# Patient Record
(demographics unavailable — no encounter records)

---

## 2017-01-01 NOTE — NEWBORN ADMISSION
Delivery Information


Date of Service


2017.





[place] Information


 YOB: 2017


[place] Time of Birth:  0801


 Birth Weight:


2.985 kg        6lbs  9.3oz


[place] Length (height) inches:  19.00


Infant Head Circumference:  35.00


Sex:  Female


Race:  





Attendance at Delivery


Pediatrician ATTN at delivery?:  No





Method of Delivery


Delivery Type:  vaginal delivery





Gestational Age


Gestational Age:  39.3





Mother's Information


Demographics:  Age (27),  (3), Para (1 now 2), Living children (1 now 2)


Marital Status:  


Blood Type:  O, rh -


Group B Strep Status:  negative


VDRL:  Non-reactive


Rubella Status:  Immune


HbSAg:  negative


HIV:  negative


Chlamydia:  negative


Gonorrhea:  negative


Maternal Anesthesia:  epidural





Delivery Care


Resuscitation:  stimulation/drying


Transported to nursery:  doing well





APGAR Scoring


APGAR 1 Minute:  8


APGAR 5 minute:  9





Admission Physical


Physical Examination


General Appearance:  + normal appearance, + normal nutrition, + normal tone


Skin:  No jaundice, No rash


Head/Neck:  + anterior fontanelle open & flat, + molding


Eyes:  + red reflex bilaterally, No conjunctivitis, No scleral icterus


Ears, Nose, Throat:  + ear canals patent, + nares patent, No lip deformity, No 

palate deformity


Thorax:  + normal appearance


Lungs:  + clear


Heart:  + regular rate and rhythm, No murmur


Abdomen:  + normal bowel sounds, + soft, No mass


Female Genitalia:  + normal female


Trunk & Spine:  No abnormalities


Extremities:  + clavicles intact, No hip click


Reflexes:  + normal ana, + normal suck


Anus:  patent





Impression


term, AGA

## 2017-01-01 NOTE — DIAGNOSTIC IMAGING REPORT
CHEST 2 VIEWS ROUTINE



CLINICAL HISTORY: 3 months-old Female presenting with eval for pneumonia. 



TECHNIQUE: Portable supine AP view of the chest was obtained.



COMPARISON:  2017.



FINDINGS:

Cardiothymic silhouette normal. Mild bronchial wall thickening suggested. Lungs

and pleural spaces clear. Osseous structures and upper abdomen normal.



IMPRESSION:

1.  Mild bronchial wall thickening could be seen in the setting of reactive

airways disease or viral bronchiolitis. No focal infiltrate to suggest

pneumonia.







Electronically signed by:  Barrett Rock M.D.

2017 7:41 AM



Dictated Date/Time:  2017 7:39 AM

## 2017-01-01 NOTE — DIAGNOSTIC IMAGING REPORT
ABDOMEN LIMITED (US)



CLINICAL HISTORY: Projectile vomiting.   



COMPARISON STUDY:  None.



FINDINGS: The pylorus is normal in length and thickness. Fluid was seen passing

through the pyloric channel. 



IMPRESSION:  No evidence for hypertrophic pyloric stenosis. 









Electronically signed by:  Chang Armenta M.D.

2017 12:30 PM



Dictated Date/Time:  2017 12:29 PM

## 2017-01-01 NOTE — DIAGNOSTIC IMAGING REPORT
CHEST 2 VIEWS ROUTINE



CLINICAL HISTORY: cough, congestion dyspnea



COMPARISON STUDY:  No previous studies for comparison.



FINDINGS: Mild pulmonary hyperaeration. Slight diaphragmatic flattening. No

focal infiltrate. No evidence for cardiac enlargement. 



IMPRESSION:  Mild pulmonary hyperaeration. No focal infiltrate. 









Electronically signed by:  Braulio Richardson M.D.

2017 12:05 PM



Dictated Date/Time:  2017 12:05 PM

## 2017-01-01 NOTE — NEWBORN DISCHARGE
Delivery Information


Date of Service


2017.





[place] Information


 YOB: 2017


[place] Time of Birth:  0801


Infant Head Circumference:  35.00


Sex:  Female


Race:  





Attendance at Delivery


Pediatrician ATTN at delivery?:  No





Method of Delivery


Delivery Type:  vaginal delivery





Gestational Age


Gestational Age:  39.3





Mother's Information


Demographics:  Age (27),  (3), Para (1 now 2), Living children (1 now 2)


Marital Status:  


[place] Name:  Daily


Blood Type:  O, rh -


Group B Strep Status:  negative


VDRL:  Non-reactive


Rubella Status:  Immune


HbSAg:  negative


HIV:  negative


Chlamydia:  negative


Gonorrhea:  negative


Maternal Anesthesia:  epidural





Delivery Care


Resuscitation:  stimulation/drying


Transported to nursery:  doing well





APGAR Scoring


APGAR 1 Minute:  8


APGAR 5 minute:  9





Discharge Physical


Admission Date:  2017


Infant Head Circumference:  35.00


 Length (height) inches:  19.00


[place] Birth Weight:


2.985 kg        6lbs  9.3oz


Discharge Weight:


2.920kg         6lbs 7.0oz


Weight Change (Kilograms):  -0.065


Percent Weight Change:  -2.00


Discharge Date:  2017


Physical Examination


General Appearance:  + normal appearance, + normal nutrition, + normal tone


Skin:  No jaundice, No rash


Head/Neck:  + anterior fontanelle open & flat


Eyes:  + red reflex bilaterally, No conjunctivitis, No scleral icterus


Ears, Nose, Throat:  + ear canals patent, + nares patent, No lip deformity, No 

palate deformity


Thorax:  + normal appearance


Lungs:  + clear, No abnormal respiratory effort


Heart:  + normal pulses (+2 femorals), + regular rate and rhythm, No murmur


Abdomen:  + normal bowel sounds, + soft, No mass


Female Genitalia:  + normal female


Trunk & Spine:  No abnormalities (None visible)


Extremities:  + clavicles intact, + normal hips, No hip click


Reflexes:  + normal grasp, + normal ana, + normal suck


Anus:  patent





Laboratory Results











Test


  17


08:01


 


Cord Blood Type O POSITIVE 


 


Direct Antiglobulin Test


([place]) NEGATIVE 


 


 


Direct Antiglobulin Test, Poly NEG 











Impression & Diagnosis


healthy, term, AGA








Jaundice Risk Assessment


minimal





Hepatitis B Vaccine


Hepatitis B Vaccine Given On:  2017





Discharge Comments


Condition at Discharge:  Stable


Type of Feeding:  Breast (and supplementing with EBM and Similac)


Feeding:  well


Follow-Up Date:  2017


Additional Comments:


[facility] at [facility] with Dr. Meek at 1 pm

## 2017-01-01 NOTE — EMERGENCY ROOM VISIT NOTE
History


Report prepared by Jac:  Tiffany Fonseca


Under the Supervision of:  Dr. Eduardo Samaniego M.D.


First contact with patient:  11:14


Chief Complaint:  COUGH


Stated Complaint:  COUGH,CONGESTION,VOMITTING





History of Present Illness


The patient is a 2M 15D old female who presents to the Emergency Room with 

complaints of persistent congestion for the past two months.  The patient's 

mother states that the patient has been seen by her pediatrician multiple times 

and has seen multiple providers for her symptoms.  She reports that the patient 

has had stomach issues and cough and congestion.  The patient patient's mother 

states that the patient had an ultrasound which was negative.  She states that 

she has raised concern for reflux, but states that the pediatrician was not 

concerned about reflux at this time.  The patient's mother states that she has 

been thickening the patient's Alimentum Formula with cereal.  She states that 

last evening the patient coughed persistently last evening, gagged, and then 

spit up four ounces of fluid.  The patient's father states that the patient 

appears increasingly restricted with lying flat.  The patient's mother states 

that the patient is typically fussy if she is awake and not asleep.  The parent 

denies LOC, headache, fevers, chills, visual complaints, neck pain/limited ROM, 

sore throat, difficulty with swallowing, chest pain, back pain, abdominal pain, 

melena, hematochezia, urinary symptoms, numbness/weakness, lymphadenopathy, rash

, joint tenderness/swelling, or other complaints.





   Source of History:  parent


   Onset:  tow months


   Position:  other (global)


   Quality:  other (congestion)


   Timing:  other (persistent)


   Associated Symptoms:  + cough


Note:


Associated Symptoms: fussiness, gagging, stomach issues





Review of Systems


See HPI for pertinent positives and negatives.  A total of ten systems were 

reviewed and were otherwise negative.





Past Medical & Surgical


Medical Problems:


(1) Normal vaginal delivery


(2) Term birth of female 








Family History





Cancer


Gallbladder disease


Heart disease


Lung disease





Social History


Smoking Status:  Never Smoker


Smokeless Tobacco Use:  No


Alcohol Use:  none


Drug Use:  none


Marital Status:  single


Housing Status:  lives with family





Current/Historical Medications


Scheduled


Nystatin (Nystatin Suspension), 1 ML PO QID


Ranitidine Hcl (Zantac), 0.6 ML PO TID





Allergies


Coded Allergies:  


     No Known Allergies (Unverified , 17)





Physical Exam


Vital Signs











  Date Time  Temp Pulse Resp B/P (MAP) Pulse Ox O2 Delivery O2 Flow Rate FiO2


 


17 13:35  155 36  96   


 


17 10:59 37.3 148 38  96 Room Air  











Physical Exam


GENERAL: Awake, alert, well appearing, nontoxic, in no distress


HEAD: Atraumatic. No edema. [place] is soft.


EYES: Normal conjunctiva. Sclera non-icteric.


EARS: Right TM normal. Left TM normal.


NOSE: Clear nasal congestion.


OROPHARYNX: Oral thrush.  Lips, tongue, and mucosa unremarkable. No exudate, 

ulcerations.


NECK: Supple. No nuchal rigidity. FROM. No adenopathy.


RESPIRATORY: CTA bilaterally


CARDIAC: Regular rate, normal rhythm.


ABDOMEN: Soft, non distended. No tenderness to palpation. No hernias.


BACK: Unremarkable.


: Unremarkable. 


SKIN: No rash or jaundice noted. No desquamation.


LYMPH: No adenopathy.


MUSCULOSKELETAL: No edema or ecchymosis. No joint swelling. 


NEURO: Normal sensorium. No sensory or motor deficits noted.





Medical Decision & Procedures


ER Provider


Diagnostic Interpretation:


X-ray: Per my interpretation, radiologist review. 





CHEST 2 VIEWS ROUTINE





CLINICAL HISTORY: cough, congestion dyspnea





COMPARISON STUDY:  No previous studies for comparison.





FINDINGS: Mild pulmonary hyperaeration. Slight diaphragmatic flattening. No


focal infiltrate. No evidence for cardiac enlargement. 





IMPRESSION:  Mild pulmonary hyperaeration. No focal infiltrate. 





Electronically signed by:  Braulio Richardson M.D.


2017 12:05 PM





Dictated Date/Time:  2017 12:05 PM





ED Course


1124: The patient was evaluated in room C9. A complete history and physical 

exam was performed.





1300: I reevaluated the patient and she is doing well and fed great.  I 

discussed the exam findings with the patients parents and I discussed the 

treatment plan. 





1305: I discussed the patients case with Dr. Alvarado [facility].  She 

states that the patient should be started on Zantac.





1325: I reevaluated the patient and she is doing well.  I discussed the updated 

treatment plan with the patient's parents and they verbalized complete 

understanding and agreement.  They are ready to take the patient home.





Medical Decision


Medication Reconciliation: I attest that I have personally reviewed the patient'

s current medication list





Triage Nursing notes reviewed.


The patient's presentation and history were concerning for vomiting, cough and 

congestion.








Etiologies such as reflux, thrush, congestion, pyloric stenosis, dehydration, 

viral syndrome, otitis, pharyngitis, pneumonia, meningitis, urinary tract 

infection, sepsis, bacteremia, anatomic abnormality, intussusception, as well 

as others were entertained.





  The patient was evaluated.  Clinically she looks fantastic.  She has some 

nasal congestion but this is not purulent.  Her reflexes are good.  She is 

strong.  She does not appear dehydrated.  Because of the persistent cough a 

chest x-ray was performed and was unremarkable.  The patient had a consultation 

made with pediatrics.  I discussed the case with Dr. Alvarado.  Given the history 

and symptoms we felt that reflux was likely intervening to the issue.  The 

thrush may also be contributing.  The patient will continue the nystatin.  The 

patient will be started on ranitidine at Dr. Alvarado's request and will follow-up 

closely in the office.  I discussed this with the patient's family.  They felt 

very comfortable with this.  I gave my usual and customary discussion regarding 

this issue.  The patient has any problems she will come back to the emergency 

department for reevaluation.





Consults


Time Called:  1300


Consulting Physician:  [facility]


Returned Call:  1305


I discussed the patients case with [facility].  She 

states that the patient should be started on Zantac.





Impression





 Primary Impression:  


 Vomiting


 Additional Impression:  


 Oral thrush





Scribe Attestation


The scribe's documentation has been prepared under my direction and personally 

reviewed by me in its entirety. I confirm that the note above accurately 

reflects all work, treatment, procedures, and medical decision making performed 

by me.





Departure Information


Dispostion


Home / Self-Care





Prescriptions





Ranitidine Hcl (ZANTAC) 75 Mg/5 Ml Syp


0.6 ML PO TID for 30 Days, #54 ML 1 Refill


   Prov: Eduardo Samaniego MD         17





Referrals


Chang Meek MD (PCP)





Forms


HOME CARE DOCUMENTATION FORM,                                                 

               IMPORTANT VISIT INFORMATION





Patient Instructions


My [facility]





Additional Instructions





Ranitidine liquid: 0.6ml 3 times daily for reflux.





Continue current nystatin.





Keep the child upright or in a car seat after feedings.





Raise the head of the crib slightly as discussed.





Return to the Emergency Room for vomiting, bloody stool, fever, abnormal 

behavior, or as needed.





Follow-up with pediatrics by the end of the week for reassessment.  Call today 

to set the appointment.





Problem Qualifiers

## 2017-01-01 NOTE — EMERGENCY ROOM VISIT NOTE
History


Report prepared by Husamibbarbra:  Preet Hernandez


Under the Supervision of:  Dr. Sultana Rivera D.O.


First contact with patient:  00:48


Chief Complaint:  COUGH


Stated Complaint:  GAGING COUCH,CONGESTION





History of Present Illness


The patient is a 3M 28D year old female who presents to the Emergency Room with 

complaints of a worsening cough that started three days ago. The patient is 

accompanied by her mother who states that the patient started to experience a 

cough and yellow rhinorrhea that began three days ago. She states that she 

noticed the patient was experiencing dyspnea because she has been gagging for 

air. Mom reports that the patient was sleeping tonight, but woke herself up due 

to her dyspnea and cough. She states that the patient felt warm when she picked 

the patient up. Mom reports that she tried to use a humidifier to alleviate 

symptoms, but denies any relief. She admits that the patient was seen in the ED 

a month ago for similar symptoms, but states that these current symptoms are 

worse. Mom states that the patient has been eating normal and had 2 ounces in 

the morning yesterday, 3 ounces at , and 6 ounces after . She 

reports that the patient was born three days early and has a history of acid 

reflux. Mom states that she and the patient's sister have been experiencing 

sinus infections, but denies that the patient has been around anyone sick. Mom 

denies any fevers.





   Source of History:  patient


   Onset:  three days ago


   Position:  other (global)


   Timing:  worsening


   Modifying Factors (Relieving):  other


   Associated Symptoms:  + SOB, No fevers





Review of Systems


See HPI for pertinent positives & negatives. A total of 10 systems reviewed and 

were otherwise negative.





Past Medical & Surgical


Medical Problems:


(1) Acid reflux


(2) Normal vaginal delivery


(3) Term birth of female 








Family History





Cancer


Gallbladder disease


Heart disease


Lung disease





Social History


Smoking Status:  Never Smoker


Alcohol Use:  none


Drug Use:  none


Marital Status:  single


Housing Status:  lives with family





Current/Historical Medications


Scheduled


Ranitidine Hcl (Zantac), 0.7 ML PO TID





Allergies


Coded Allergies:  


     No Known Allergies (Unverified , 17)





Physical Exam


Vital Signs











  Date Time  Temp Pulse Resp B/P (MAP) Pulse Ox O2 Delivery O2 Flow Rate FiO2


 


8/3/17 02:22 37.0 143 30  97 Room Air  


 


8/3/17 01:08     97 Room Air  


 


8/3/17 00:21 37.7 164 28  97 Room Air  











Physical Exam


HEENT: Fontanelles are soft and flat. Head - normocephalic and atraumatic   

Pupils are equal, round, and reactive to light.  Extraocular eye muscles are 

intact, and sclera are anicteric.   Nose -  moist nasal mucosa without 

discharge. Mouth - moist buccal mucosa.  Oropharynx is nonerythematous and 

there is no tonsillar exudate or edema noted.


EARs: normal TMs


Neck: no cervical adenopathy


Heart: Regular rate and rhythm.  There is a normal S1 and S2 with no murmurs, 

clicks, or gallops appreciated.


Lungs: Clear to auscultation bilaterally with no wheezes, rales, or rhonchi.


Abdomen: Soft, completely nontender, nondistended, with good bowel sounds.  

There are no palpable pulsatile masses or hepatosplenomegaly.  There is no 

guarding, rigidity, or rebound noted.


Extremities: No evidence of cyanosis, clubbing, or edema.  There are easily 

palpable peripheral pulses.


Skin: warm and dry with good turgor and no rashes.





Medical Decision & Procedures


ER Provider


Diagnostic Interpretation:


X-ray results as stated below per interpretation by me:





CHEST XRAY: No obvious pulmonary infiltrates.





ED Course


0051: Past medical records reviewed. The patient was evaluated in room B05. A 

complete history and physical exam was performed.  The patient went for a chest 

x-ray which was unremarkable.  O2 saturations were monitored and were stable.











0201: I reevaluated the patient and she was asleep. Her oxygen saturation is 

normal. I discussed the results and treatment plan with the patient's mother. 

She understands and agrees. The patient was discharged home.





Medical Decision


The patient is a 3 month old female who presents to the ED with complaints of a 

worsening cough that began three days ago. Differential diagnosis includes 

bronchiolitis, pneumonia, URI, viral illness





The child was in no acute respirator distress.  O2 saturations were stable.  

Chest x-ray was unremarkable.  I did witness the child coughing at one point 

and she did seem to have some excessive secretions which cost her to gag.  I 

believe that she most likely suffers from post tussive emesis.





I've asked the mother to follow up with pediatrician for reevaluation today.





Impression





 Primary Impression:  


 Cough





Scribe Attestation


The scribe's documentation has been prepared under my direction and personally 

reviewed by me in its entirety. I confirm that the note above accurately 

reflects all work, treatment, procedures, and medical decision making performed 

by me.





Departure Information


Dispostion


Home / Self-Care





Referrals


Chang Meek MD (PCP)





Forms


HOME CARE DOCUMENTATION FORM,                                                 

               IMPORTANT VISIT INFORMATION





Patient Instructions


My [facility]





Additional Instructions





Watch her closely for any respiratory distress.





Follow up later today with Peds for a recheck





Turn her on her side if she is coughing





Use bulb syringe to suction the nose

## 2017-01-01 NOTE — DISCHARGE INSTRUCTIONS
Discharge Instructions


Date of Service


2017.





Birthday & Weight Information


Birthday:  17        


Time of Birth:  08:01


Birth Weight:  2.985 kg   6lbs  9.3oz





.





Discharge Weight Information


.


Discharge Weight:  2.920kg   6lbs 7.0oz


Weight Change (Kilograms):   -0.065         


Percent Weight Change:   -2.00 % 





.





Impression / Diagnosis


Impression / Diagnosis:  


(1) Term birth of female 





 Blood Type











Test


  17


08:01


 


Cord Blood Type O POSITIVE 








.





[place] Supplemental Screening has been completed.





.





Procedures


Procedures Performed:  none





Hepatitis B Vaccine


1st Hepatitis B Vaccine Given:  2017





Instructions


Type of Feeding:  Breast (and supplementing with EBM and Similac)


.





Feeding Instructions





If Breastfeeding:





* Feed baby at least 8-10 times in 24 hours.


* Babies most often nurse every 2-3 hours.  Time this from the beginning of the 

first feeding to the beginning of the next.


* Complete breastfeeding log record.  Take with you to your first visit with 

the baby's doctor.


* Call doctor if baby has less wet or soiled diapers than expected.





.





Baby's Office Visit


Follow-Up:  2017


[facility] at [facility] with Dr. Meek at 1 pm





Provider Instructions


.


SPECIAL CARE INSTRUCTIONS:





Bathing:


* Sponge baths every 2-3 days.  No tub baths until cord is completely healed.  

This usually takes 10-14 days.








Call your baby's doctor if:





* Temperature is greater that or equal to 100.4 degrees Fahrenheit or 38.0 

degrees Celsius.  Any fever up to the age of eight weeks needs to be evaluated 

by the physician.  Do not give any medications to infants without first talking 

with their physician.





* Yellow/green drainage, foul odor, increased redness or swelling of cord/

circumcision.





* Unable to awaken baby or excessive irritability.





* Your infant has any green vomiting.





* Diarrhea (frequent large watery stools or bloody/mucousy stools).





* Breathing difficulty (other than stuffy nose).





* Skin color changes.


 * blue spells


 * increased jaundice (yellow) that is not improving








Instructions noted above were prepared by Anthony Ledezma.


.

## 2018-01-06 NOTE — EMERGENCY ROOM VISIT NOTE
History


Report prepared by Jac:  Cooper Anthony


Under the Supervision of:  Dr. Ron Torre D.O.


First contact with patient:  11:06


Chief Complaint:  FLU LIKE SX


Stated Complaint:  PERSISTANT COUGH, VOMITING, CONGESTION, EYE "GOOP"





History of Present Illness


The patient is a 9M 0D old female who presents to the Emergency Room with 

complaints of an intermittent cough beginning a few days ago. The patient's 

father states the patient had an ear infection and eye drainage three weeks ago 

and stopped taking antibiotics two weeks ago. He reports for the past two days 

she has been experiencing an intermittent cough and runny nose. The father 

notes she had a two hour coughing spell last night and a 15 minute coughing 

spell today. He states the patient was evaluated at [facility] 

yesterday and was told she had a URI. The father reports her symptoms were 

persistent still, and he wanted the patient to be evaluated again. He notes she 

had a decrease in wet diapers, and her feces has been dark. The father states 

she coughed to the point she vomited mucus today. He reports the patient had 3 

oz of formula from a bottle today, and then is less than usual. The father 

notes the patient's shots are UTD. He denies fevers.





   Source of History:  patient


   Onset:  a few days ago


   Position:  other (global)


   Quality:  other (cough)


   Timing:  intermittent


   Associated Symptoms:  No fevers


Note:


Associated symptoms: runny nose, eye drainage, dark feces, decrease in wet 

diapers





Review of Systems


See HPI for pertinent positives & negatives. A total of 10 systems reviewed and 

were otherwise negative.





Past Medical & Surgical


Medical Problems:


(1) Acid reflux


(2) Normal vaginal delivery


(3) Term birth of female 








Family History





Cancer


Gallbladder disease


Heart disease


Lung disease





Social History


Smoking Status:  Never Smoker


Alcohol Use:  none


Drug Use:  none


Marital Status:  single


Housing Status:  lives with family





Current/Historical Medications


No Active Prescriptions or Reported Meds





Allergies


Coded Allergies:  


     No Known Allergies (Unverified , 18)





Physical Exam


Vital Signs











  Date Time  Temp Pulse Resp B/P (MAP) Pulse Ox O2 Delivery O2 Flow Rate FiO2


 


18 12:30  136 24  97 Room Air  


 


18 10:55 37.0 141 28  94 Room Air  











Physical Exam


GENERAL: Sitting up in bed, supporting head, clear/green rhinorrhea bilaterally

, well appearing, well nourished, no distress, non-toxic 


HEAD: fontanels soft


EYE EXAM: normal conjunctiva


OROPHARYNX: no exudate, no erythema, lips, buccal mucosa, and tongue normal and 

mucous membranes are moist


EARS: TM clear b/l


NECK: supple, no nuchal rigidity, no adenopathy, non-tender


LUNGS: Clear to auscultation. Normal chest wall mechanics


HEART: no murmurs, S1 normal and S2 normal 


ABDOMEN: abdomen soft, non-tender, normo-active bowel sounds, no masses, no 

rebound or guarding. 


BACK: Back is symmetrical on inspection and there is no deformity.


: normal external genitalia


SKIN: no rashes and no bruising 


UPPER EXTREMITIES: upper extremities are grossly normal. 


LOWER EXTREMITIES: cap refill < 3 seconds  


NEURO EXAM: alert, interacting age appropriately, moving all extremities.  

Normal sensorium. Able to support head. Positive grasp.





Medical Decision & Procedures


ER Provider


Diagnostic Interpretation:


Radiology results as stated below per my review and the radiologist's 

interpretation: 





CHEST ONE VIEW PORTABLE





CLINICAL HISTORY: cough     





COMPARISON STUDY:  2017





FINDINGS: The cardiac images so contours are normal. There is no focal pulmonary


consolidation. There are no pleural effusions. There is no pneumomediastinum.[ 





IMPRESSION: No active disease in the chest.











Electronically signed by:  Yaakov Sood M.D.


2018 11:47 AM





Dictated Date/Time:  2018 11:46 AM





ED Course


ED COURSE: 


Vital signs were reviewed and showed a tachycardic heart rate.


The patients medical record was reviewed


The above diagnostic studies were performed and reviewed.


ED treatments and interventions as stated above. 





1105: The patient was evaluated in room B08. A complete history and physical 

examination was performed.





1248: Upon reevaluation, the patient is resting comfortably. I discussed my 

findings with the patient's father and he understands and agrees with the 

treatment plan.   





Based on the patients age, coexisting illnesses, exam and lab findings the 

decision to treat as an outpatient was made.


The patient remained stable while under my care.


The patient appeared well at the time of discharge.





Medical Decision


Pediatric Fever:


Otitis media, pneumonia, urinary tract infection, meningitis, bronchitis, 

sinusitis, influenza, other viral illness.





Patient is a 9-month-old female whose shots are up-to-date with no significant 

past medical history the presents to ER for cough associated with a runny nose 

and decreased oral intake.  2 wet diapers today as one is present on exam.  

Patient is otherwise well-appearing.  No wrist or distress.  Sitting up in bed 

tracking.  Lungs with referred upper airway noises.  Chest x-ray was obtained 

and was unremarkable.  Based on exam and patient well-appearing she was 

discharged follow-up with PCP as an outpatient with a viral URI. Discussed with 

parent concerning signs and symptoms to watch out for. Parent was instructed to 

follow up with their PCP and discussed with the parent their option to return 

to the ED at anytime for persistent or worsening symptoms. The appropriate 

anticipatory guidance and out-patient management, including indications for 

return to the emergency department, were explained at length to the parent and 

understood.





Medication Reconcilliation


Current Medication List:  was personally reviewed by me





Blood Pressure Screening


Patient's blood pressure:  Normal blood pressure


Blood pressure disposition:  Did not require urgent referral





Impression





 Primary Impression:  


 Upper respiratory infection





Scribe Attestation


The scribe's documentation has been prepared under my direction and personally 

reviewed by me in its entirety. I confirm that the note above accurately 

reflects all work, treatment, procedures, and medical decision making performed 

by me.





Departure Information


Dispostion


Home / Self-Care





Prescriptions





No Active Prescriptions or Reported Meds





Referrals


Anette Farmer DO (PCP)





Forms


HOME CARE DOCUMENTATION FORM,                                                 

               IMPORTANT VISIT INFORMATION





Patient Instructions


ED URI Sheyla, [facility]





Additional Instructions





See your doctor for a recheck visit tomorrow or as soon as possible.





Home Care:





-Use saline (salt water) nose drops to clear excess mucus. This works best just 

before trying to feed your child.


-Use a cool mist vaporizer if the air is dry.


-Use Tylenol as needed for fevers.





Call your doctor or return to the emergency department if worse or:


-Child is having more difficulty breathing.


-You hear grunting noises with Katerine breathing.


-You see retractions (skin between or under the ribs is sucked in) when 

breathing.


-Your see nasal flaring (nostrils getting big) with breathing.


-Child is not drinking well and is making less urine.


-Color is pale or blue/gray in the lips or fingernails (call 911).


-Child appears to stop breathing (call 911)





Problem Qualifiers








 Primary Impression:  


 Upper respiratory infection


 URI type:  unspecified URI  Qualified Codes:  J06.9 - Acute upper respiratory 

infection, unspecified

## 2018-01-06 NOTE — DIAGNOSTIC IMAGING REPORT
CHEST ONE VIEW PORTABLE



CLINICAL HISTORY: cough     



COMPARISON STUDY:  2017



FINDINGS: The cardiac images so contours are normal. There is no focal pulmonary

consolidation. There are no pleural effusions. There is no pneumomediastinum.[ 



IMPRESSION: No active disease in the chest.







Electronically signed by:  Yaakov Sood M.D.

1/6/2018 11:47 AM



Dictated Date/Time:  1/6/2018 11:46 AM

## 2018-01-21 NOTE — EMERGENCY ROOM VISIT NOTE
History


Report prepared by Jac:  Bernard Williamson


Under the Supervision of:  Dr. Sultana Rivera D.O.


First contact with patient:  02:20


Chief Complaint:  FEVER


Stated Complaint:  NOT MAKING TEARS,DRY DIAPERS,FEVER 101+





History of Present Illness


The patient is a 9M 15D year old female who presents to the Emergency Room with 

complaints of a constant fever that began 1 day ago. Patient is present with 

her father. Father states that the patient has associated symptoms of 

congestion and diarrhea. He adds that the fever has not been relieved with 

Tylenol for the past day. He states the patient has been on Augmentin for the 

past 5 days. He states the patient has been intermittently sick for the past 

month with ear infections and a URI. Father adds that the child was seen at 

[facility] yesterday for the patient's ear infection. He states that the patient 

is up to date with her immunizations. He adds that the patient has been exposed 

to the stomach bug from family members over the past week. He adds that the 

patient has had minimal urine and only 2 wet diapers. He states the patient has 

refused food. He states he applies nystatin cream to the patient for a yeast 

infection. Father adds that the patient is currently in .





   Source of History:  patient, parent (Father)


   Onset:  1 day ago


   Timing:  constant


   Modifying Factors (Relieving):  other (None)


   Associated Symptoms:  + diarrhea


Note:


Patient has congestion.





Review of Systems


See HPI for pertinent positives & negatives. A total of 10 systems reviewed and 

were otherwise negative.





Past Medical & Surgical


Medical Problems:


(1) Acid reflux


(2) Normal vaginal delivery


(3) Term birth of female 








Family History





Cancer


Gallbladder disease


Heart disease


Lung disease





Social History


Smoking Status:  Never Smoker


Alcohol Use:  none


Drug Use:  none


Marital Status:  single


Housing Status:  lives with family





Current/Historical Medications


No Active Prescriptions or Reported Meds





Allergies


Coded Allergies:  


     No Known Allergies (Unverified , 18)





Physical Exam


Vital Signs











  Date Time  Temp Pulse Resp B/P (MAP) Pulse Ox O2 Delivery O2 Flow Rate FiO2


 


18 04:26 38.2 158 36  96 Room Air  


 


18 02:01 38.3 163 24  96 Room Air  











Physical Exam


HEENT: Head - normocephalic and atraumatic. Fontanelles are soft and flat.   

Pupils are equal, round, and reactive to light.  Extraocular eye muscles are 

intact, and sclera are anicteric.   Nose -  moist nasal mucosa with thick 

yellow discharge. Mouth - dry buccal mucosa.  Oropharynx is nonerythematous and 

there is no tonsillar exudate or edema noted. Ears - Cerumen filled canals


Neck: Supple; no JVD, nuchal rigidity, cervical lymphadenopathy, or auscultated 

bruits.


Heart: Tachycardic rate and rhythm.  There is a normal S1 and S2 with no murmurs

, clicks, or gallops appreciated.


Lungs: Clear to auscultation bilaterally with no wheezes, rales, or rhonchi.


Abdomen: Soft, completely nontender, nondistended, with good bowel sounds.  

There are no palpable pulsatile masses or hepatosplenomegaly.  There is no 

guarding, rigidity, or rebound noted.


Extremities: No evidence of cyanosis, clubbing, or edema.  There are easily 

palpable peripheral pulses.


Skin: warm and dry with good turgor and no rashes.





Medical Decision & Procedures


ER Provider


Diagnostic Interpretation:


Radiology results as stated below per my review and the radiologist's 

interpretation: 





Chest X-Ray:





No obvious pneumonia. No pulmonary consolidation.





Laboratory Results


18 03:10








Red Blood Count 4.31, Mean Corpuscular Volume 80.3, Mean Corpuscular Hemoglobin 

27.6, Mean Corpuscular Hemoglobin Concent 34.4, Mean Platelet Volume 8.0, 

Neutrophils (%) (Auto) 43.6, Lymphocytes (%) (Auto) 45.7, Monocytes (%) (Auto) 

9.4, Eosinophils (%) (Auto) 0.3, Basophils (%) (Auto) 0.4, Neutrophils # (Auto) 

3.93, Lymphocytes # (Auto) 4.12, Monocytes # (Auto) 0.85, Eosinophils # (Auto) 

0.03, Basophils # (Auto) 0.04





18 03:10

















Test


  18


02:48 18


03:10


 


Influenza Type A Antigen


  Neg for Influ


A (NEG) 


 


 


Influenza Type B Antigen


  Neg for Influ


B (NEG) 


 


 


Respiratory Syncytial Virus


Antigen POS for RSV


(NEG) 


 


 


White Blood Count


  


  9.02 K/uL


(6.0-17.5)


 


Red Blood Count


  


  4.31 M/uL


(3.7-5.3)


 


Hemoglobin


  


  11.9 g/dL


(10.5-14.0)


 


Hematocrit  34.6 % (33-39) 


 


Mean Corpuscular Volume


  


  80.3 fL


(70-86)


 


Mean Corpuscular Hemoglobin


  


  27.6 pg


(23-31)


 


Mean Corpuscular Hemoglobin


Concent 


  34.4 g/dl


(30-36)


 


Platelet Count


  


  303 K/uL


(130-400)


 


Mean Platelet Volume


  


  8.0 fL


(7.4-10.4)


 


Neutrophils (%) (Auto)  43.6 % 


 


Lymphocytes (%) (Auto)  45.7 % 


 


Monocytes (%) (Auto)  9.4 % 


 


Eosinophils (%) (Auto)  0.3 % 


 


Basophils (%) (Auto)  0.4 % 


 


Neutrophils # (Auto)


  


  3.93 K/uL


(1.0-8.5)


 


Lymphocytes # (Auto)


  


  4.12 K/uL


(4.0-13.5)


 


Monocytes # (Auto)


  


  0.85 K/uL


(0-1.8)


 


Eosinophils # (Auto)


  


  0.03 K/uL


(0-1.0)


 


Basophils # (Auto)


  


  0.04 K/uL


(0-0.3)


 


RDW Standard Deviation


  


  39.5 fL


(36.4-46.3)


 


RDW Coefficient of Variation


  


  13.3 %


(11.5-14.5)


 


Immature Granulocyte % (Auto)  0.6 % 


 


Immature Granulocyte # (Auto)


  


  0.05 K/uL


(0.00-0.02)


 


Anion Gap


  


  9.0 mmol/L


(3-11)


 


Estimated GFR (


American) 


   


 


 


Estimated GFR (Non-


American 


   


 


 


BUN/Creatinine Ratio  47.3 


 


Calcium Level


  


  9.2 mg/dl


(9.0-11.0)





Laboratory results per my review.





Medications Administered











 Medications


  (Trade)  Dose


 Ordered  Sig/Mary


 Route  Start Time


 Stop Time Status Last Admin


Dose Admin


 


 Sodium Chloride


  (Nss Pediatric


 Bolus)  150 ml  NOW  STAT


 IV  18 02:47


 18 02:49 DC 18 02:47


150 ML


 


 Acetaminophen


  (Tylenol


 Children'S Susp)  100 mg  NOW  STAT


 PO  18 02:49


 18 02:52 DC 18 02:59


100 MG


 


 Sodium Chloride


  (Nss Pediatric


 Bolus)  75 ml  NOW  STAT


 IV  18 04:27


 18 04:28 DC 18 04:27


75 ML











Procedure


Sodium Chloride 150 ml IV, Acetaminophen 100mg PO, Sodium Chloride 75ml IV





ED Course


0235: The patient was evaluated in room B2. A complete history and physical 

examination were performed. Nursing notes and previous electronic medical 

records were reviewed.  IV lock was established and labs were drawn as above.  

Her nose was swabbed for RSV and influenza.





0247: Sodium Chloride 150 ml IV





0249: Acetaminophen 100mg PO.  She went for chest x-ray as described above.





0306: I performed a bulb syringe suctioning on the patient.





0357: I discussed the patient's lab findings with her father. Patient's oxygen 

saturation was stable while she was sleeping.





0427: Sodium Chloride 75ml IV





0500: Patient is sleeping.





0525: Patient is resting comfortably. Patient has stable oxygen saturation 

levels.





0700: Upon reevaluation, the patient is resting comfortably. I discussed 

findings and results with her and her father. They verbalized agreement of the 

treatment plan. She was discharged home.





Medical Decision


The patient is a 9M 15D old female who presents to the ED with a fever. 

Differential diagnosis includes dehydration, RSV, pneumonia, influenza, and  

bronchiolitis.





Lab results show RSV positive, influenza negative, white blood count of 9, 

stable H&H, and normal renal function and glucose.





This is an infant female who presents emergency Department with her father 

complaining of cough, upper respiratory congestion, and possible dehydration.  

On physical exam, the child was making tears.  There were no outward signs of 

dehydration.





RSV swab was positive.  She had no episodes of hypoxia.  Chest x-ray was 

unremarkable.  The father was encouraged to continue administering Augmentin 

for the ear infection and it would take some time for the RSV to clear.  They 

can do bulb syringe suctioning





Impression





 Primary Impression:  


 RSV (acute bronchiolitis due to respiratory syncytial virus)





Scribe Attestation


The scribe's documentation has been prepared under my direction and personally 

reviewed by me in its entirety. I confirm that the note above accurately 

reflects all work, treatment, procedures, and medical decision making performed 

by me.





Departure Information


Dispostion


Home / Self-Care





Prescriptions





No Active Prescriptions or Reported Meds





Referrals


Anette Farmer DO (PCP)





Forms


HOME CARE DOCUMENTATION FORM,                                                 

               IMPORTANT VISIT INFORMATION





Patient Instructions


ED RSV Bronchiolitis, My [facility]





Additional Instructions





Watch the child closely for any respiratory distress.





Use bulb syringe to suction the nose.








Finish augmentin for ears.





Follow up for a recheck with peds on Monday

## 2018-01-21 NOTE — DIAGNOSTIC IMAGING REPORT
CHEST 2 VIEWS ROUTINE



HISTORY:  9 months-old Female cough acute cough and fever



COMPARISON: Chest radiograph 1/6/2018



TECHNIQUE: PA and lateral views of the chest



FINDINGS: 

Cardiac silhouette is within normal limits. There are hazy perihilar opacities

with mild to moderate central bronchial wall thickening. No pneumothorax,

pleural effusion, focal airspace consolidation or overt pulmonary edema. Bones

of the chest appear grossly intact. Upper abdomen is unremarkable. No abnormal

calcifications.



IMPRESSION: 

Mild to moderate viral or inflammatory airways disease without focal airspace

consolidation to suggest pneumonia. 







The above report was generated using voice recognition software. It may contain

grammatical, syntax or spelling errors.







Electronically signed by:  Angel Sarkar M.D.

1/21/2018 6:22 AM



Dictated Date/Time:  1/21/2018 6:21 AM